# Patient Record
Sex: FEMALE | Race: OTHER | HISPANIC OR LATINO | URBAN - METROPOLITAN AREA
[De-identification: names, ages, dates, MRNs, and addresses within clinical notes are randomized per-mention and may not be internally consistent; named-entity substitution may affect disease eponyms.]

---

## 2022-05-12 ENCOUNTER — LAB OUTSIDE AN ENCOUNTER (OUTPATIENT)
Dept: URBAN - METROPOLITAN AREA CLINIC 70 | Facility: CLINIC | Age: 52
End: 2022-05-12

## 2022-05-12 ENCOUNTER — WEB ENCOUNTER (OUTPATIENT)
Dept: URBAN - METROPOLITAN AREA CLINIC 70 | Facility: CLINIC | Age: 52
End: 2022-05-12

## 2022-05-12 ENCOUNTER — OFFICE VISIT (OUTPATIENT)
Dept: URBAN - METROPOLITAN AREA CLINIC 70 | Facility: CLINIC | Age: 52
End: 2022-05-12
Payer: SELF-PAY

## 2022-05-12 DIAGNOSIS — R19.5 HEME + STOOL: ICD-10-CM

## 2022-05-12 PROCEDURE — 99203 OFFICE O/P NEW LOW 30 MIN: CPT | Performed by: NURSE PRACTITIONER

## 2022-05-12 RX ORDER — PYRIDOXINE HCL (VITAMIN B6) 100 MG
1 TABLET TABLET ORAL ONCE A DAY
Status: ACTIVE | COMMUNITY

## 2022-05-12 NOTE — HPI-TODAY'S VISIT:
Patient referred by Dr. Rita Morris for screening colonoscopy.  A copy of this note will be sent to the referring provider.  Patient speaks little English so her daughter is interpreting this visit.  Reports heme + stool by PCP one week ago.  Denies rectal bleeding, melena, abdominal pain, diarrhea, family hx of colon cancer or IBD.  Defecation occurs about 1-2 times a day and voices a complete sense of evacuation.  Daughter states patient weight flucuates due to intermittent fasting and occasional water fast.  Voices long hx of stimulant laxatives, unsure if due to constipation or diet control.    Has hx of hemorrhoids for the last 27 years after birth of last child.  Occasional BRRB voiced due to this with last episode being 3 years ago.  Denies surgery in regards to this.  Symptomatic hemorrhoids described as protrusion of tissue or perirectal itching.  Uses preparation H as needed.   Labs 04/27/2022:  Hgb 11.7, MCV 90.6, .   Denies prior colonoscopy.

## 2022-05-12 NOTE — PHYSICAL EXAM RECTAL:
Normal tone, no external hemorrhoids, no melena, normal tone, no rectal bleeding, stool brown in color.

## 2022-05-18 ENCOUNTER — TELEPHONE ENCOUNTER (OUTPATIENT)
Dept: URBAN - METROPOLITAN AREA CLINIC 70 | Facility: CLINIC | Age: 52
End: 2022-05-18

## 2022-05-24 ENCOUNTER — CLAIMS CREATED FROM THE CLAIM WINDOW (OUTPATIENT)
Dept: URBAN - METROPOLITAN AREA SURGERY CENTER 24 | Facility: SURGERY CENTER | Age: 52
End: 2022-05-24
Payer: SELF-PAY

## 2022-05-24 ENCOUNTER — CLAIMS CREATED FROM THE CLAIM WINDOW (OUTPATIENT)
Dept: URBAN - METROPOLITAN AREA SURGERY CENTER 24 | Facility: SURGERY CENTER | Age: 52
End: 2022-05-24

## 2022-05-24 ENCOUNTER — CLAIMS CREATED FROM THE CLAIM WINDOW (OUTPATIENT)
Dept: URBAN - METROPOLITAN AREA CLINIC 4 | Facility: CLINIC | Age: 52
End: 2022-05-24
Payer: SELF-PAY

## 2022-05-24 DIAGNOSIS — R19.5 HEME + STOOL: ICD-10-CM

## 2022-05-24 DIAGNOSIS — K63.89 CYST OF DUODENUM: ICD-10-CM

## 2022-05-24 DIAGNOSIS — K63.89 BACTERIAL OVERGROWTH SYNDROME: ICD-10-CM

## 2022-05-24 PROCEDURE — G8907 PT DOC NO EVENTS ON DISCHARG: HCPCS | Performed by: INTERNAL MEDICINE

## 2022-05-24 PROCEDURE — 45380 COLONOSCOPY AND BIOPSY: CPT | Performed by: INTERNAL MEDICINE

## 2022-05-24 PROCEDURE — 88305 TISSUE EXAM BY PATHOLOGIST: CPT | Performed by: PATHOLOGY

## 2022-06-15 ENCOUNTER — WEB ENCOUNTER (OUTPATIENT)
Dept: URBAN - METROPOLITAN AREA CLINIC 70 | Facility: CLINIC | Age: 52
End: 2022-06-15

## 2022-06-17 ENCOUNTER — DASHBOARD ENCOUNTERS (OUTPATIENT)
Age: 52
End: 2022-06-17

## 2022-06-21 ENCOUNTER — OFFICE VISIT (OUTPATIENT)
Dept: URBAN - METROPOLITAN AREA CLINIC 70 | Facility: CLINIC | Age: 52
End: 2022-06-21

## 2022-06-21 RX ORDER — PYRIDOXINE HCL (VITAMIN B6) 100 MG
1 TABLET TABLET ORAL ONCE A DAY
Status: ACTIVE | COMMUNITY